# Patient Record
Sex: MALE | URBAN - METROPOLITAN AREA
[De-identification: names, ages, dates, MRNs, and addresses within clinical notes are randomized per-mention and may not be internally consistent; named-entity substitution may affect disease eponyms.]

---

## 2022-05-05 ENCOUNTER — APPOINTMENT (RX ONLY)
Dept: URBAN - METROPOLITAN AREA CLINIC 74 | Facility: CLINIC | Age: 57
Setting detail: DERMATOLOGY
End: 2022-05-05

## 2022-05-05 DIAGNOSIS — L72.8 OTHER FOLLICULAR CYSTS OF THE SKIN AND SUBCUTANEOUS TISSUE: ICD-10-CM

## 2022-05-05 PROBLEM — D48.5 NEOPLASM OF UNCERTAIN BEHAVIOR OF SKIN: Status: ACTIVE | Noted: 2022-05-05

## 2022-05-05 PROCEDURE — ? COUNSELING

## 2022-05-05 PROCEDURE — ? SEPARATE AND IDENTIFIABLE DOCUMENTATION

## 2022-05-05 PROCEDURE — ? INTRALESIONAL KENALOG

## 2022-05-05 PROCEDURE — ? OBSERVATION AND MEASURE

## 2022-05-05 PROCEDURE — 11900 INJECT SKIN LESIONS </W 7: CPT

## 2022-05-05 PROCEDURE — 99202 OFFICE O/P NEW SF 15 MIN: CPT | Mod: 25

## 2022-05-05 ASSESSMENT — LOCATION ZONE DERM: LOCATION ZONE: TRUNK

## 2022-05-05 ASSESSMENT — LOCATION SIMPLE DESCRIPTION DERM: LOCATION SIMPLE: CHEST

## 2022-05-05 ASSESSMENT — LOCATION DETAILED DESCRIPTION DERM: LOCATION DETAILED: RIGHT LATERAL SUPERIOR CHEST

## 2022-05-05 NOTE — HPI: BUMPS
How Severe Are Your Bumps?: moderate
Is This A New Presentation, Or A Follow-Up?: Bump
Additional History: Pt reports lump in the scar from lvad surgery done 6/10/21

## 2022-05-05 NOTE — PROCEDURE: INTRALESIONAL KENALOG
Validate Note Data When Using Inventory: Yes
Administered By (Optional): maximilian elizabeth
X Size Of Lesion In Cm (Optional): 0
Medical Necessity Clause: This procedure was medically necessary because the lesions that were treated were:\\na/s did not help/not successful
Total Volume Injected (Ccs- Only Use Numbers And Decimals): 0.5
Concentration Of Solution Injected (Mg/Ml): 10.0
Include Z78.9 (Other Specified Conditions Influencing Health Status) As An Associated Diagnosis?: No
Kenalog Preparation: Kenalog
Consent: The risks of atrophy were reviewed with the patient.
Detail Level: Detailed

## 2022-06-02 ENCOUNTER — APPOINTMENT (RX ONLY)
Dept: URBAN - METROPOLITAN AREA CLINIC 74 | Facility: CLINIC | Age: 57
Setting detail: DERMATOLOGY
End: 2022-06-02

## 2022-06-02 DIAGNOSIS — L08.9 LOCAL INFECTION OF THE SKIN AND SUBCUTANEOUS TISSUE, UNSPECIFIED: ICD-10-CM | Status: INADEQUATELY CONTROLLED

## 2022-06-02 DIAGNOSIS — L72.8 OTHER FOLLICULAR CYSTS OF THE SKIN AND SUBCUTANEOUS TISSUE: ICD-10-CM

## 2022-06-02 PROCEDURE — ? DIAGNOSIS COMMENT

## 2022-06-02 PROCEDURE — ? OBSERVATION AND MEASURE

## 2022-06-02 PROCEDURE — ? PRESCRIPTION

## 2022-06-02 PROCEDURE — 99213 OFFICE O/P EST LOW 20 MIN: CPT | Mod: 25

## 2022-06-02 PROCEDURE — ? COUNSELING

## 2022-06-02 PROCEDURE — ? ORDER TESTS

## 2022-06-02 PROCEDURE — 11900 INJECT SKIN LESIONS </W 7: CPT

## 2022-06-02 PROCEDURE — ? PATIENT SPECIFIC COUNSELING

## 2022-06-02 PROCEDURE — ? INTRALESIONAL KENALOG

## 2022-06-02 PROCEDURE — ? TREATMENT REGIMEN

## 2022-06-02 RX ORDER — MUPIROCIN 20 MG/G
OINTMENT TOPICAL
Qty: 22 | Refills: 0 | Status: ERX | COMMUNITY
Start: 2022-06-02

## 2022-06-02 RX ADMIN — MUPIROCIN: 20 OINTMENT TOPICAL at 00:00

## 2022-06-02 ASSESSMENT — LOCATION SIMPLE DESCRIPTION DERM: LOCATION SIMPLE: CHEST

## 2022-06-02 ASSESSMENT — LOCATION ZONE DERM: LOCATION ZONE: TRUNK

## 2022-06-02 ASSESSMENT — LOCATION DETAILED DESCRIPTION DERM: LOCATION DETAILED: RIGHT LATERAL SUPERIOR CHEST

## 2022-06-02 NOTE — PROCEDURE: INTRALESIONAL KENALOG
X Size Of Lesion In Cm (Optional): 0
Administered By (Optional): Dr Ila Arenas
Total Volume Injected (Ccs- Only Use Numbers And Decimals): 0.5
Consent: The risks of atrophy were reviewed with the patient.
Detail Level: Simple
Medical Necessity Clause: This procedure was medically necessary because the lesions that were treated were:
Kenalog Preparation: Kenalog
Validate Note Data When Using Inventory: Yes
Treatment Number (Optional): 2
Concentration Of Solution Injected (Mg/Ml): 10.0
Include Z78.9 (Other Specified Conditions Influencing Health Status) As An Associated Diagnosis?: No

## 2022-06-02 NOTE — PROCEDURE: DIAGNOSIS COMMENT
Detail Level: Simple
Render Risk Assessment In Note?: no
Comment: - superficial infection , likely from open wound of ruptured cyst\\n- culture obtained\\n- mupirocin oint bid x 2-4 wks\\n
Comment: - improved but still irritated, painful, pt wants treated

## 2022-06-02 NOTE — PROCEDURE: PATIENT SPECIFIC COUNSELING
Detail Level: Zone
- reports flattened out some s/p ILK 10 injection w/ Dr. Sanches 05/05/22\\n- notes that it popped a few days ago while rubbing against his satchel bag\\n- counseled pt on possibility of infection \\n- cx taken in clinic today to r/o infection\\n- disc r/b/sed repeat ILK 10 injection\\n- pt opted for repeat ILK 10 inj today in clinic\\n- pt to start mupirocin oint BID over site x2 weeks\\n- RTC 6 weeks
- disc r/b/sed of mupirocin ointment \\n- pt to start \\n- cx taken in clinic today \\n- will call with cx results

## 2022-06-02 NOTE — PROCEDURE: TREATMENT REGIMEN
Detail Level: Zone
Initiate Treatment: mupirocin 2 % topical ointment: apply to affected areas on body bid x 2 weeks

## 2022-06-02 NOTE — PROCEDURE: ORDER TESTS
Expected Date Of Service: 06/02/2022
Performing Laboratory: 0
Billing Type: Third-Party Bill
Bill For Surgical Tray: no

## 2022-07-13 ENCOUNTER — APPOINTMENT (RX ONLY)
Dept: URBAN - METROPOLITAN AREA CLINIC 74 | Facility: CLINIC | Age: 57
Setting detail: DERMATOLOGY
End: 2022-07-13

## 2022-07-13 DIAGNOSIS — L72.8 OTHER FOLLICULAR CYSTS OF THE SKIN AND SUBCUTANEOUS TISSUE: ICD-10-CM

## 2022-07-13 DIAGNOSIS — L08.9 LOCAL INFECTION OF THE SKIN AND SUBCUTANEOUS TISSUE, UNSPECIFIED: ICD-10-CM

## 2022-07-13 PROCEDURE — ? PATIENT SPECIFIC COUNSELING

## 2022-07-13 PROCEDURE — ? TREATMENT REGIMEN

## 2022-07-13 PROCEDURE — ? COUNSELING

## 2022-07-13 PROCEDURE — 99213 OFFICE O/P EST LOW 20 MIN: CPT

## 2022-07-13 PROCEDURE — ? OBSERVATION AND MEASURE

## 2022-07-13 PROCEDURE — ? DIAGNOSIS COMMENT

## 2022-07-13 ASSESSMENT — LOCATION DETAILED DESCRIPTION DERM: LOCATION DETAILED: RIGHT LATERAL SUPERIOR CHEST

## 2022-07-13 ASSESSMENT — LOCATION ZONE DERM: LOCATION ZONE: TRUNK

## 2022-07-13 ASSESSMENT — LOCATION SIMPLE DESCRIPTION DERM: LOCATION SIMPLE: CHEST

## 2022-07-13 NOTE — PROCEDURE: PATIENT SPECIFIC COUNSELING
Detail Level: Zone
- reports ruptured cyst flattened after 2nd injection of ILK 10 last office visit \\n- disc cyst appears still healing today; will heal with time \\n- counseled cyst does not appear to need another ILK inj today in clinic \\n- counseled to cont using mupirocin BID over site\\n- to reassess cyst at next follow up to potentially have cyst removed sooner rather than later \\n- RTC 2 mo
- repeat disc r/b/sed of mupirocin ointment \\n- pt to cont using mupirocin ointment on area \\n- disc cx results with pt today

## 2022-07-13 NOTE — PROCEDURE: TREATMENT REGIMEN
Detail Level: Zone
Continue Regimen: mupirocin 2 % topical ointment: apply to affected areas on body bid x 2 weeks

## 2022-07-13 NOTE — PROCEDURE: DIAGNOSIS COMMENT
Comment: - culture reviewed, skin cyndee, \\n- pt scheduled for consultation at [facility] for a heart transplant.  \\n- wound still healing, if pt requires excision, would need to be done well in advance if pt is requiring any major procedures
Detail Level: Simple
Render Risk Assessment In Note?: no